# Patient Record
Sex: FEMALE | URBAN - METROPOLITAN AREA
[De-identification: names, ages, dates, MRNs, and addresses within clinical notes are randomized per-mention and may not be internally consistent; named-entity substitution may affect disease eponyms.]

---

## 2018-07-02 NOTE — PATIENT DISCUSSION
(K21.011) Keratoconjunct sicca, not specified as Sjogren's, bilateral - Assesment : Examination revealed Dry Eye Syndrome - Plan : Monitor for changes. ATs prn daily.

## 2018-07-02 NOTE — PATIENT DISCUSSION
(Z01.00) Encounter for exam of eyes and vision w/o abnormal findings - Assesment : Examination reveals normal health eyes. - Plan : Monitor for changes. Advised that vision will changes as Pt grows and more rapidly during growth spurts. Call with any vision changes. Updated GLRx given today. RTC in 1-2 years for Exam, sooner if problems or changes.

## 2018-11-21 ENCOUNTER — IMPORTED ENCOUNTER (OUTPATIENT)
Dept: URBAN - METROPOLITAN AREA CLINIC 1 | Facility: CLINIC | Age: 83
End: 2018-11-21

## 2018-11-21 PROBLEM — Z79.84: Noted: 2018-11-21

## 2018-11-21 PROBLEM — E11.9: Noted: 2018-11-21

## 2018-11-21 PROBLEM — H16.143: Noted: 2018-11-21

## 2018-11-21 PROBLEM — H04.123: Noted: 2018-11-21

## 2018-11-21 PROBLEM — H47.013: Noted: 2018-11-21

## 2018-11-21 PROBLEM — H35.033: Noted: 2018-11-21

## 2018-11-21 PROBLEM — Z96.1: Noted: 2018-11-21

## 2018-11-21 PROCEDURE — 92015 DETERMINE REFRACTIVE STATE: CPT

## 2018-11-21 PROCEDURE — 92004 COMPRE OPH EXAM NEW PT 1/>: CPT

## 2018-11-21 NOTE — PATIENT DISCUSSION
1.  DM Type II (Oral Meds) without sign of diabetic retinopathy and no blot heme on dilated retinal examination today OU No Macular Edema:  Discussed the pathophysiology of diabetes and its effect on the eye and risk of blindness. Stressed the importance of strong glucose control. Advised of importance of at least yearly dilated examinations but to contact us immediately for any problems or concerns. 2. DIMITRI w/ PEK OU- Use ATs TID OU Routinely. 3.  Pseudophakia OU - (Done elsewhere) 4. 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 Denisse Stahl.

## 2019-07-26 NOTE — PATIENT DISCUSSION
(J13.378) Keratoconjunct sicca, not specified as Sjogren's, bilateral - Assesment : Examination revealed Dry Eye Syndrome - Plan : Monitor for changes. Recommended ATs prn daily.

## 2019-07-26 NOTE — PATIENT DISCUSSION
(H52.223) Regular astigmatism, bilateral - Assesment : Refractive testing reveals mild astigmatism. - Plan : Explained that Astigmatism can cause some distortion of lights and recommended wearing GLRx to improve glare with night driving. Updated GLRx given today. Call with any vision changes, problems, or concerns. RTC in 1-2 years for Exam, sooner if problems or changes.

## 2020-10-16 ENCOUNTER — IMPORTED ENCOUNTER (OUTPATIENT)
Dept: URBAN - METROPOLITAN AREA CLINIC 1 | Facility: CLINIC | Age: 85
End: 2020-10-16

## 2020-10-16 PROBLEM — Z96.1: Noted: 2020-10-16

## 2020-10-16 PROBLEM — H35.033: Noted: 2020-10-16

## 2020-10-16 PROBLEM — Z79.84: Noted: 2020-10-16

## 2020-10-16 PROBLEM — E11.9: Noted: 2020-10-16

## 2020-10-16 PROBLEM — H04.123: Noted: 2020-10-16

## 2020-10-16 PROBLEM — H16.143: Noted: 2020-10-16

## 2020-10-16 PROBLEM — H47.013: Noted: 2020-10-16

## 2020-10-16 PROCEDURE — 92014 COMPRE OPH EXAM EST PT 1/>: CPT

## 2020-10-16 PROCEDURE — 92015 DETERMINE REFRACTIVE STATE: CPT

## 2020-10-16 NOTE — PATIENT DISCUSSION
1.  DM Type II (Oral Medication) without sign of diabetic retinopathy and no blot heme on dilated retinal examination today OU No Macular Edema:  Discussed the pathophysiology of diabetes and its effect on the eye and risk of blindness. Stressed the importance of strong glucose control. Advised of importance of at least yearly dilated examinations but to contact us immediately for any problems or concerns. 2. DIMITRI w/ PEK OU- Recommend  ATs TID OU routinely 3. Pseudophakia OU - (Done elsewhere) 4. 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 Return for an appointment in 1 year 27 with Dr. Rios Bales.

## 2021-10-20 ENCOUNTER — IMPORTED ENCOUNTER (OUTPATIENT)
Dept: URBAN - METROPOLITAN AREA CLINIC 1 | Facility: CLINIC | Age: 86
End: 2021-10-20

## 2021-10-20 PROBLEM — H04.123: Noted: 2021-10-20

## 2021-10-20 PROBLEM — H16.143: Noted: 2021-10-20

## 2021-10-20 PROBLEM — E11.9: Noted: 2021-10-20

## 2021-10-20 PROBLEM — Z79.84: Noted: 2021-10-20

## 2021-10-20 PROCEDURE — 92015 DETERMINE REFRACTIVE STATE: CPT

## 2021-10-20 PROCEDURE — 99214 OFFICE O/P EST MOD 30 MIN: CPT

## 2021-10-20 NOTE — PATIENT DISCUSSION
1.  DM Type II (Oral Medication) without sign of diabetic retinopathy and no blot heme on dilated retinal examination today OU No Macular Edema:  Discussed the pathophysiology of diabetes and its effect on the eye and risk of blindness. Stressed the importance of strong glucose control. Advised of importance of at least yearly dilated examinations but to contact us immediately for any problems or concerns. 2. DIMITRI w/ PEK OU- Recommend  ATs TID OU routinely 3. Pseudophakia OU - (Done elsewhere) 4. 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 Letter to PCP. Return for an appointment in 1 year 27 with Dr. Yousif Aldridge.

## 2022-04-02 ASSESSMENT — VISUAL ACUITY
OD_SC: 20/30
OD_SC: 20/25
OS_SC: 20/25+1
OD_CC: J2
OS_CC: J2
OS_CC: J2
OD_SC: 20/20
OD_GLARE: 20/100
OD_CC: 20/25
OS_SC: 20/20-1
OD_CC: J2
OD_GLARE: 20/100
OS_CC: 20/25
OS_GLARE: 20/100
OS_GLARE: 20/100
OS_SC: 20/30

## 2022-04-02 ASSESSMENT — TONOMETRY
OD_IOP_MMHG: 18
OD_IOP_MMHG: 17
OS_IOP_MMHG: 16
OS_IOP_MMHG: 17
OD_IOP_MMHG: 16
OS_IOP_MMHG: 18

## 2022-10-26 ENCOUNTER — COMPREHENSIVE EXAM (OUTPATIENT)
Dept: URBAN - METROPOLITAN AREA CLINIC 1 | Facility: CLINIC | Age: 87
End: 2022-10-26

## 2022-10-26 DIAGNOSIS — H35.033: ICD-10-CM

## 2022-10-26 DIAGNOSIS — H04.123: ICD-10-CM

## 2022-10-26 DIAGNOSIS — Z96.1: ICD-10-CM

## 2022-10-26 DIAGNOSIS — E11.9: ICD-10-CM

## 2022-10-26 DIAGNOSIS — H16.143: ICD-10-CM

## 2022-10-26 PROCEDURE — 99214 OFFICE O/P EST MOD 30 MIN: CPT

## 2022-10-26 PROCEDURE — 92015 DETERMINE REFRACTIVE STATE: CPT

## 2022-10-26 ASSESSMENT — VISUAL ACUITY
OD_CC: J1
OS_BAT: 20/100
OS_CC: 20/30
OS_CC: J1
OD_CC: 20/30
OD_BAT: 20/100

## 2022-10-26 ASSESSMENT — TONOMETRY
OS_IOP_MMHG: 14
OD_IOP_MMHG: 17

## 2023-05-24 ENCOUNTER — EMERGENCY VISIT (OUTPATIENT)
Dept: URBAN - METROPOLITAN AREA CLINIC 1 | Facility: CLINIC | Age: 88
End: 2023-05-24

## 2023-05-24 DIAGNOSIS — Z96.1: ICD-10-CM

## 2023-05-24 DIAGNOSIS — Z98.890: ICD-10-CM

## 2023-05-24 DIAGNOSIS — I69.898: ICD-10-CM

## 2023-05-24 PROCEDURE — 99213 OFFICE O/P EST LOW 20 MIN: CPT

## 2023-05-24 PROCEDURE — 92015 DETERMINE REFRACTIVE STATE: CPT

## 2023-05-24 PROCEDURE — 92083 EXTENDED VISUAL FIELD XM: CPT

## 2023-05-24 ASSESSMENT — VISUAL ACUITY
OS_CC: 20/30
OD_CC: 20/30
OD_BAT: 20/200
OS_CC: J3
OS_BAT: 20/100
OD_CC: J5

## 2023-05-24 ASSESSMENT — TONOMETRY
OS_IOP_MMHG: 14
OD_IOP_MMHG: 15